# Patient Record
Sex: FEMALE | Race: WHITE | NOT HISPANIC OR LATINO | Employment: OTHER | URBAN - METROPOLITAN AREA
[De-identification: names, ages, dates, MRNs, and addresses within clinical notes are randomized per-mention and may not be internally consistent; named-entity substitution may affect disease eponyms.]

---

## 2024-06-24 PROCEDURE — 99497 ADVNCD CARE PLAN 30 MIN: CPT | Performed by: EMERGENCY MEDICINE

## 2024-06-24 PROCEDURE — 99235 HOSP IP/OBS SAME DATE MOD 70: CPT | Performed by: EMERGENCY MEDICINE

## 2024-10-06 ENCOUNTER — APPOINTMENT (OUTPATIENT)
Dept: CARDIOLOGY | Facility: HOSPITAL | Age: 89
End: 2024-10-06
Payer: MEDICARE

## 2024-10-06 ENCOUNTER — HOSPITAL ENCOUNTER (EMERGENCY)
Facility: HOSPITAL | Age: 89
Discharge: HOME | End: 2024-10-06
Attending: EMERGENCY MEDICINE
Payer: MEDICARE

## 2024-10-06 VITALS
HEART RATE: 89 BPM | OXYGEN SATURATION: 99 % | SYSTOLIC BLOOD PRESSURE: 137 MMHG | RESPIRATION RATE: 19 BRPM | TEMPERATURE: 97.5 F | DIASTOLIC BLOOD PRESSURE: 83 MMHG
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
## 2024-10-06 PROCEDURE — 99283 EMERGENCY DEPT VISIT LOW MDM: CPT

## 2024-10-06 PROCEDURE — 93005 ELECTROCARDIOGRAM TRACING: CPT

## 2024-10-06 ASSESSMENT — LIFESTYLE VARIABLES
HAVE PEOPLE ANNOYED YOU BY CRITICIZING YOUR DRINKING: NO
EVER FELT BAD OR GUILTY ABOUT YOUR DRINKING: NO
EVER HAD A DRINK FIRST THING IN THE MORNING TO STEADY YOUR NERVES TO GET RID OF A HANGOVER: NO
HAVE YOU EVER FELT YOU SHOULD CUT DOWN ON YOUR DRINKING: NO
TOTAL SCORE: 0

## 2024-10-06 ASSESSMENT — COLUMBIA-SUICIDE SEVERITY RATING SCALE - C-SSRS
1. IN THE PAST MONTH, HAVE YOU WISHED YOU WERE DEAD OR WISHED YOU COULD GO TO SLEEP AND NOT WAKE UP?: NO
2. HAVE YOU ACTUALLY HAD ANY THOUGHTS OF KILLING YOURSELF?: NO
6. HAVE YOU EVER DONE ANYTHING, STARTED TO DO ANYTHING, OR PREPARED TO DO ANYTHING TO END YOUR LIFE?: NO

## 2024-10-06 NOTE — ED PROVIDER NOTES
Limitations to History: Dementia    HPI:      Autumn ALCIDES Chakraborty is a 89 y.o. with significant past medical history for hypertension, AAA, colon cancer, TIA, Alzheimer's, COPD and dementia who is a DNR CC presenting to ED today from [facility] via EMS for evaluation after a fall.  HPI and review of systems limited due to the patient's dementia.  EMS reports patient was found on the ground in her room.  Fall was unwitnessed.  Patient was awake and mentating at baseline.  C-collar applied per EMS.  Patient complains of mild low back pain.  Limited ROS.  Unknown social history.  PCP is Dr. Guerrero.     Additional History Obtained from: Triage/nursing notes.  EMS report.  SNF paperwork reviewed.  Son via phone    ------------------------------------------------------------------------------------------------------------------------------------------    VS: As documented in the triage note and EMR flowsheet from this visit were reviewed.    Physical Exam:  Gen: Pleasant elderly  female, oriented to name only.  Follows commands.  Cooperative.  Well-nourished and hydrated.  Head/Neck: NCAT, neck w/ FROM.  No midline C-spine tenderness, no step-off.  Eyes: EOMI, PERRL, anicteric sclerae, noninjected conjunctivae  Ears: TMs clear b/l without sign of infection  Nose: Nares patent w/o rhinorrhea  Mouth:  MMM, no OP lesions noted  Heart: RRR no MRG  Lungs: CTA b/l no RRW, no increased work of breathing  Abdomen: soft, NT, ND, no HSM, no palpable masses  Musculoskeletal: No midline T or L-spine tenderness, no step-off.  Mild discomfort bilaterally in the paraspinal lumbosacral region.  Negative straight leg raise.  CONOR x 4.  MSPs intact.  No deformities.  Pelvis stable.  Neurologic: Alert, symmetrical facies, phonates clearly, moves all extremities equally, responsive to touch, ambulates normally   Skin: Pink, warm and dry.  No rashes noted  Psychological: calm, no  SI/HI      ------------------------------------------------------------------------------------------------------------------------------------------    Medical Decision Makin y.o. with significant past medical history for hypertension, AAA, colon cancer, TIA, Alzheimer's, COPD and dementia who is a DNRCC  is evaluated at the bedside after an unwitnessed fall in her room at present facility.  Patient was found on the ground.  Unknown LOC, no use of blood thinners.  Complaining of mild low back pain.  C-collar placed by EMS.  On arrival to the ED, vital signs within normal limits.  EMS reported that the patient was in A-fib with RVR at a rate of 148.  EKG shows that the patient is rate controlled.  I discussed plan of care with son Yash via the phone.  Since the patient is DNR comfort care, we cannot perform any imaging or lab work.  C-collar removed.  Patient is resting fairly comfortably.  Mentating at baseline.  Son is coming in.  Patient be discharged back to the facility.  Resume normal medications.  Patient is prescribed Percocet at the facility which can be used for pain.  Return precautions and legs discussed with son and SNF staff.  Questions were thoroughly answered.  Son verbalizes understanding of diagnosis and treatment plan.  Condition stable for return to facility.         EKG interpreted by Carlos at 1521.  Sinus tachycardia with irregular rate of 103.  No ST segment depression or elevation consistent with ischemia or infarction.    Chronic Medical Conditions Significantly Affecting Care: Dementia    External Records Reviewed: I reviewed recent and relevant outside records including: None    Discussion of Management with Other Providers: Seen and evaluated with ED attending physician, Dr. Gonzalez, she agrees with the treatment plan and final disposition of the patient.    I discussed the patient/results with: Son at bedside.       Queta Ramirez, MACHELLE-CNP  10/06/24 0203

## 2024-10-13 LAB
ATRIAL RATE: 166 BPM
P AXIS: 0 DEGREES
PR INTERVAL: 57 MS
Q ONSET: 251 MS
QRS COUNT: 15 BEATS
QRS DURATION: 85 MS
QT INTERVAL: 350 MS
QTC CALCULATION(BAZETT): 458 MS
QTC FREDERICIA: 419 MS
R AXIS: -43 DEGREES
T AXIS: 58 DEGREES
T OFFSET: 426 MS
VENTRICULAR RATE: 103 BPM

## 2025-01-01 ENCOUNTER — HOME VISIT (OUTPATIENT)
Dept: POST ACUTE CARE | Facility: EXTERNAL LOCATION | Age: OVER 89
End: 2025-01-01
Payer: MEDICARE

## 2025-01-01 DIAGNOSIS — F03.90 ADVANCING DEMENTIA (MULTI): Primary | ICD-10-CM

## 2025-01-01 DIAGNOSIS — I48.91 ATRIAL FIBRILLATION, UNSPECIFIED TYPE (MULTI): ICD-10-CM

## 2025-01-01 DIAGNOSIS — N18.30 STAGE 3 CHRONIC KIDNEY DISEASE, UNSPECIFIED WHETHER STAGE 3A OR 3B CKD (MULTI): ICD-10-CM

## 2025-01-01 DIAGNOSIS — I10 PRIMARY HYPERTENSION: ICD-10-CM

## 2025-03-06 ENCOUNTER — HOME VISIT (OUTPATIENT)
Dept: POST ACUTE CARE | Facility: EXTERNAL LOCATION | Age: OVER 89
End: 2025-03-06
Payer: MEDICARE

## 2025-03-06 DIAGNOSIS — I48.91 ATRIAL FIBRILLATION, UNSPECIFIED TYPE (MULTI): ICD-10-CM

## 2025-03-06 DIAGNOSIS — M19.90 OSTEOARTHRITIS, UNSPECIFIED OSTEOARTHRITIS TYPE, UNSPECIFIED SITE: ICD-10-CM

## 2025-03-06 DIAGNOSIS — F41.9 ANXIETY DISORDER, UNSPECIFIED TYPE: ICD-10-CM

## 2025-03-06 DIAGNOSIS — N18.30 STAGE 3 CHRONIC KIDNEY DISEASE, UNSPECIFIED WHETHER STAGE 3A OR 3B CKD (MULTI): ICD-10-CM

## 2025-03-06 DIAGNOSIS — F03.90 ADVANCING DEMENTIA (MULTI): Primary | ICD-10-CM

## 2025-03-06 DIAGNOSIS — I10 PRIMARY HYPERTENSION: ICD-10-CM

## 2025-03-06 PROCEDURE — 99497 ADVNCD CARE PLAN 30 MIN: CPT | Performed by: EMERGENCY MEDICINE

## 2025-03-06 PROCEDURE — 99348 HOME/RES VST EST LOW MDM 30: CPT | Performed by: EMERGENCY MEDICINE

## 2025-03-06 NOTE — PROGRESS NOTES
Autumn Chakraborty   89 y.o.  female  @location@            Assessment and Plan:  History and physical  1. Anxiety disorder due to known physiological condition - ICD9: 300.00, ICD10: F06.4 (primary diagnosis)  Stable on current regimen of Risperdal 0.5 mg BID for anxiety and agitation    2. Generalized osteoarthrosis, involving multiple sites - ICD9: 715.09, ICD10: M15.9  Stable, chronic    3. Stage 3a chronic kidney disease (HCC) - ICD9: 585.3, ICD10: N18.31  - eGFR: 84 Improving  - Counseled on avoiding NSAIDs, adequate hydration    4. Oth fracture of shaft of left femur, init for clos fx (HCC) - ICD9: 821.01, ICD10: S72.392A  Routine healing per ortho  Has oxycodone prn for pain relief   Continue therapies for improving functional status and safety    5. Atrial fib/flutter, transient (HCC) - ICD9: 427.31, 427.32, ICD10: I48.91, I48.92  Asymptomatic - not a candidate for anticoag due to age, recurrent falls    6. Benign essential HTN - ICD9: 401.1, ICD10: I10  - Controlled  - Continue current medications  - Encouraged sodium restriction, DASH or Mediterranean diet  - Recommend regular aerobic exercise    7. Moderate late onset Alzheimer's dementia with mood disturbance (HCC) - ICD9: 331.0, 294.11, ICD10: G30.1, F02.B3  Continue to benefit from residing in secure memory unit for safety, med administration and assistance with ADLs    -Fall prevention    -Cognitive engagement     -Monitor and treat blood pressure     -Aggressive decubitus ulcer prevention.     -Bowel and bladder care     -Optimal nutrition and supplementation as needed     -GI  and DVT prophylaxis     -Symptom control     -Ambulation as tolerated     -Will follow    Charting is done using voice recognition software and may contain errors which have not been completely corrected    I discussed advanced care planning for more than 16 minutes including the explanation and discussion of advanced directives. Information and advise was also provided on  DO NOT RESUSCITATE and patient encouraged to consider this  Patient is not sure about DNR at this time.      Source of history: Nurse, Medical personnel, Medical record, Patient.  History limitation: None.    HPI:  History and physical    Patient is unable to give any detailed history and therefore history is obtained from the chart  No acute complaints voiced by the patient or acute concerns raised by nursing    Problem List/Past Medical History Ongoing Alzheimer's dementia Debility Dehydration Fall HTN (hypertension) Metabolic encephalopathy UTI (urinary tract infection), bacterial Procedure/Surgical History   c section   repair aneurysm brain with metal plat.    Allergies codeine Social History   Alcohol - Denies Alcohol Use   Substance Abuse - Denies Substance Abuse   Tobacco - Denies Tobacco Use Family History Alzheimers disease: Mother.      Physical Exam:  Vital signs as per nursing/MA documentation were reviewed  General appearance: Alert and in no acute distress  HEENT: Normal Inspection  Neck - Normal Inspection  Respiratory : No respiratory distress. Lungs are clear   Cardiovascular: heart rate normal. No gallop  Back - normal inspection  Skin inspection:Warm  Musculoskeletal : No deformities  Neuro : Limited exam. Baseline    ROS: Review of symptoms is negative except for what is mentioned in HPI    Results/Data  Records including but not limited to electronic medical records, relevant lab work and imaging from patient's health care facility encounter were reviewed and independently verified      Charting was completed using voice recognition technology and may include unintended errors.    Discussed with patient/family, RN, and NP.

## 2025-04-15 ENCOUNTER — HOME VISIT (OUTPATIENT)
Dept: POST ACUTE CARE | Facility: EXTERNAL LOCATION | Age: OVER 89
End: 2025-04-15
Payer: MEDICARE

## 2025-04-15 DIAGNOSIS — N18.30 STAGE 3 CHRONIC KIDNEY DISEASE, UNSPECIFIED WHETHER STAGE 3A OR 3B CKD (MULTI): Primary | ICD-10-CM

## 2025-04-15 DIAGNOSIS — I48.91 ATRIAL FIBRILLATION, UNSPECIFIED TYPE (MULTI): ICD-10-CM

## 2025-04-15 DIAGNOSIS — F03.90 ADVANCING DEMENTIA (MULTI): ICD-10-CM

## 2025-04-15 DIAGNOSIS — I10 PRIMARY HYPERTENSION: ICD-10-CM

## 2025-04-15 PROCEDURE — 99348 HOME/RES VST EST LOW MDM 30: CPT | Performed by: EMERGENCY MEDICINE

## 2025-04-15 NOTE — PROGRESS NOTES
Autumn Chakraborty   89 y.o.  female  @location@            Assessment and Plan:    1. Anxiety disorder due to known physiological condition - ICD9: 300.00, ICD10: F06.4 (primary diagnosis)  Stable on current regimen of Risperdal 0.5 mg BID for anxiety and agitation    2. Generalized osteoarthrosis, involving multiple sites - ICD9: 715.09, ICD10: M15.9  Stable, chronic    3. Stage 3a chronic kidney disease (HCC) - ICD9: 585.3, ICD10: N18.31  - eGFR: 84 Improving  - Counseled on avoiding NSAIDs, adequate hydration    4. Oth fracture of shaft of left femur, init for clos fx (HCC) - ICD9: 821.01, ICD10: S72.392A  Routine healing per ortho  Has oxycodone prn for pain relief   Continue therapies for improving functional status and safety    5. Atrial fib/flutter, transient (HCC) - ICD9: 427.31, 427.32, ICD10: I48.91, I48.92  Asymptomatic - not a candidate for anticoag due to age, recurrent falls    6. Benign essential HTN - ICD9: 401.1, ICD10: I10  - Controlled  - Continue current medications  - Encouraged sodium restriction, DASH or Mediterranean diet  - Recommend regular aerobic exercise    7. Moderate late onset Alzheimer's dementia with mood disturbance (HCC) - ICD9: 331.0, 294.11, ICD10: G30.1, F02.B3  Continue to benefit from residing in secure memory unit for safety, med administration and assistance with ADLs    Provide safe environment for the patient     Continue current medication regimen     OT PT and speech therapy     Bowel and bladder,skin care     Nutritional support     monitor and treat blood glucose     GI  and DVT prophylaxis     PRN medications     Periodic lab work    Regular Follow up    Charting is done using voice recognition software and may contain errors which have not been completely corrected      Source of history: Nurse, Medical personnel, Medical record, Patient.  History limitation: None.    HPI:      Patient is unable to give any detailed history and therefore history is obtained from  the chart  No acute complaints voiced by the patient or acute concerns raised by nursing    Problem List/Past Medical History Ongoing Alzheimer's dementia Debility Dehydration Fall HTN (hypertension) Metabolic encephalopathy UTI (urinary tract infection), bacterial Procedure/Surgical History   c section   repair aneurysm brain with metal plat.    Allergies codeine Social History   Alcohol - Denies Alcohol Use   Substance Abuse - Denies Substance Abuse   Tobacco - Denies Tobacco Use Family History Alzheimers disease: Mother.      Physical Exam:  Vital signs as per nursing/MA documentation were reviewed  General appearance: Alert and in no acute distress  HEENT: Normal Inspection  Neck - Normal Inspection  Respiratory : No respiratory distress. Lungs are clear   Cardiovascular: heart rate normal. No gallop  Back - normal inspection  Skin inspection:Warm  Musculoskeletal : No deformities  Neuro : Limited exam. Baseline    ROS: Review of symptoms is negative except for what is mentioned in HPI    Results/Data  Records including but not limited to electronic medical records, relevant lab work and imaging from patient's health care facility encounter were reviewed and independently verified      Charting was completed using voice recognition technology and may include unintended errors.    Discussed with patient/family, RN, and NP.

## 2025-04-24 ENCOUNTER — TELEPHONE (OUTPATIENT)
Dept: PRIMARY CARE | Facility: CLINIC | Age: OVER 89
End: 2025-04-24
Payer: MEDICARE

## 2025-05-06 ENCOUNTER — HOME VISIT (OUTPATIENT)
Dept: POST ACUTE CARE | Facility: EXTERNAL LOCATION | Age: OVER 89
End: 2025-05-06
Payer: MEDICARE

## 2025-05-06 DIAGNOSIS — I48.91 ATRIAL FIBRILLATION, UNSPECIFIED TYPE (MULTI): ICD-10-CM

## 2025-05-06 DIAGNOSIS — F41.9 ANXIETY DISORDER, UNSPECIFIED TYPE: ICD-10-CM

## 2025-05-06 DIAGNOSIS — M19.90 OSTEOARTHRITIS, UNSPECIFIED OSTEOARTHRITIS TYPE, UNSPECIFIED SITE: Primary | ICD-10-CM

## 2025-05-06 DIAGNOSIS — I10 PRIMARY HYPERTENSION: ICD-10-CM

## 2025-05-06 PROCEDURE — 99349 HOME/RES VST EST MOD MDM 40: CPT | Performed by: EMERGENCY MEDICINE

## 2025-05-06 NOTE — PROGRESS NOTES
Autumn Chakraborty   89 y.o.  female  @location@            Assessment and Plan:    1. Anxiety disorder due to known physiological condition - ICD9: 300.00, ICD10: F06.4 (primary diagnosis)  Stable on current regimen of Risperdal 0.5 mg BID for anxiety and agitation    2. Generalized osteoarthrosis, involving multiple sites - ICD9: 715.09, ICD10: M15.9  Stable, chronic    3. Stage 3a chronic kidney disease (HCC) - ICD9: 585.3, ICD10: N18.31  - eGFR: 84 Improving  - Counseled on avoiding NSAIDs, adequate hydration    4. Oth fracture of shaft of left femur, init for clos fx (HCC) - ICD9: 821.01, ICD10: S72.392A  Routine healing per ortho  Has oxycodone prn for pain relief   Continue therapies for improving functional status and safety    5. Atrial fib/flutter, transient (HCC) - ICD9: 427.31, 427.32, ICD10: I48.91, I48.92  Asymptomatic - not a candidate for anticoag due to age, recurrent falls    6. Benign essential HTN - ICD9: 401.1, ICD10: I10  - Controlled  - Continue current medications  - Encouraged sodium restriction, DASH or Mediterranean diet  - Recommend regular aerobic exercise    7. Moderate late onset Alzheimer's dementia with mood disturbance (HCC) - ICD9: 331.0, 294.11, ICD10: G30.1, F02.B3  Continue to benefit from residing in secure memory unit for safety, med administration and assistance with ADLs    Rx list reviewed.   PT and OT evaluation is in the process.   Routine safety measures, fall precautions, risk modification and alarm placement if needed for prevention of falls.   Skin care precautions, prevention of pressures sores at pressure points assessed.   Pt needs to be monitored frequently by nursing staff particularly at night time.   Any confusion, agitation or behavioural disturbance needs to be attended, as per home policy   rapid covid Ag assay need to be done, notify if positive.   If needed appropriate measures to be taken for alarm placements and assisted devices, pt was told not to get  up and ambulate at night unless help and assist available at bedside,   labs will be done as per our routine protocol.   PO intake need to be monitored if consuming po.       Charting is done using voice recognition software and may contain errors which have not been completely corrected        Source of history: Nurse, Medical personnel, Medical record, Patient.  History limitation: None.    HPI:      Patient is unable to give any detailed history and therefore history is obtained from the chart  No acute complaints voiced by the patient or acute concerns raised by nursing    Problem List/Past Medical History Ongoing Alzheimer's dementia Debility Dehydration Fall HTN (hypertension) Metabolic encephalopathy UTI (urinary tract infection), bacterial Procedure/Surgical History   c section   repair aneurysm brain with metal plat.    Allergies codeine Social History   Alcohol - Denies Alcohol Use   Substance Abuse - Denies Substance Abuse   Tobacco - Denies Tobacco Use Family History Alzheimers disease: Mother.      Physical Exam:  Vital signs as per nursing/MA documentation were reviewed  General appearance: Alert and in no acute distress  HEENT: Normal Inspection  Neck - Normal Inspection  Respiratory : No respiratory distress. Lungs are clear   Cardiovascular: heart rate normal. No gallop  Back - normal inspection  Skin inspection:Warm  Musculoskeletal : No deformities  Neuro : Limited exam. Baseline    ROS: Review of symptoms is negative except for what is mentioned in HPI    Results/Data  Records including but not limited to electronic medical records, relevant lab work and imaging from patient's health care facility encounter were reviewed and independently verified      Charting was completed using voice recognition technology and may include unintended errors.    Discussed with patient/family, RN, and NP.

## 2025-06-13 NOTE — PROGRESS NOTES
Autumn Chakraborty   89 y.o.  female  @location@            Assessment and Plan:    1. Anxiety disorder due to known physiological condition - ICD9: 300.00, ICD10: F06.4 (primary diagnosis)  Stable on current regimen of Risperdal 0.5 mg BID for anxiety and agitation    2. Generalized osteoarthrosis, involving multiple sites - ICD9: 715.09, ICD10: M15.9  Stable, chronic    3. Stage 3a chronic kidney disease (HCC) - ICD9: 585.3, ICD10: N18.31  - eGFR: 84 Improving  - Counseled on avoiding NSAIDs, adequate hydration    4. Oth fracture of shaft of left femur, init for clos fx (HCC) - ICD9: 821.01, ICD10: S72.392A  Routine healing per ortho  Has oxycodone prn for pain relief   Continue therapies for improving functional status and safety    5. Atrial fib/flutter, transient (HCC) - ICD9: 427.31, 427.32, ICD10: I48.91, I48.92  Asymptomatic - not a candidate for anticoag due to age, recurrent falls    6. Benign essential HTN - ICD9: 401.1, ICD10: I10  - Controlled  - Continue current medications  - Encouraged sodium restriction, DASH or Mediterranean diet  - Recommend regular aerobic exercise    7. Moderate late onset Alzheimer's dementia with mood disturbance (HCC) - ICD9: 331.0, 294.11, ICD10: G30.1, F02.B3  Continue to benefit from residing in secure memory unit for safety, med administration and assistance with ADLs    1. medications are reviewed      2. Continue with rehabilitative, supportive, and or restorative care as ordered and as the patient tolerates     3. Laboratory evaluations will be monitored on an ongoing as needed basis     4. Medications have been cross-referenced with the patient's diagnoses list, and medications reductions have been considered and/or implemented.     5. Pharmacy recommendations are addressed on an ongoing as needed basis.     6. Controlled substances have been electronically scripted every 60 days for opiates and others of similar schedule, and every 6 months for sedative/hypnotics  and others of similar schedule.     7. Nursing has been queried about any potential adverse events that need to be reported to me.    Salient information and adjustment of care plan pertaining to this individual patient interaction today are the following:      A. We will continue with restorative and supportive care as the patient tolerates    B. Laboratory examinations will continue to be drawn on an ongoing as-needed basis. The patient's weight needs to be monitored and if needed we may need to institute appetite stimulating medication    C. The patient's long term prognosis is guarded    Charting is done using voice recognition software and may contain errors which may not have been completely corrected          Source of history: Nurse, Medical personnel, Medical record, Patient.  History limitation: None.    HPI:      Patient is unable to give any detailed history and therefore history is obtained from the chart  No acute complaints voiced by the patient or acute concerns raised by nursing    Problem List/Past Medical History Ongoing Alzheimer's dementia Debility Dehydration Fall HTN (hypertension) Metabolic encephalopathy UTI (urinary tract infection), bacterial Procedure/Surgical History   c section   repair aneurysm brain with metal plat.    Allergies codeine Social History   Alcohol - Denies Alcohol Use   Substance Abuse - Denies Substance Abuse   Tobacco - Denies Tobacco Use Family History Alzheimers disease: Mother.      Physical Exam:  Vital signs as per nursing/MA documentation were reviewed  General appearance: Alert and in no acute distress  HEENT: Normal Inspection  Neck - Normal Inspection  Respiratory : No respiratory distress. Lungs are clear   Cardiovascular: heart rate normal. No gallop  Back - normal inspection  Skin inspection:Warm  Musculoskeletal : No deformities  Neuro : Limited exam. Baseline    ROS: Review of symptoms is negative except for what is mentioned in  HPI    Results/Data  Records including but not limited to electronic medical records, relevant lab work and imaging from patient's health care facility encounter were reviewed and independently verified      Charting was completed using voice recognition technology and may include unintended errors.    Discussed with patient/family, RN, and NP.